# Patient Record
Sex: FEMALE | Race: WHITE
[De-identification: names, ages, dates, MRNs, and addresses within clinical notes are randomized per-mention and may not be internally consistent; named-entity substitution may affect disease eponyms.]

---

## 2019-07-31 ENCOUNTER — HOSPITAL ENCOUNTER (OUTPATIENT)
Dept: HOSPITAL 92 - SCSMAMMO | Age: 54
Discharge: HOME | End: 2019-07-31
Attending: FAMILY MEDICINE
Payer: COMMERCIAL

## 2019-07-31 DIAGNOSIS — Z80.3: ICD-10-CM

## 2019-07-31 DIAGNOSIS — Z12.31: Primary | ICD-10-CM

## 2019-07-31 PROCEDURE — 77067 SCR MAMMO BI INCL CAD: CPT

## 2019-08-01 NOTE — MMO
Bilateral MAMMO Bilat Screen [facility].

 

CLINICAL HISTORY:

Patient is 53 years old and is seen for screening. The patient has the following

family history of breast cancer:  mother, at age 58.  The patient has no

personal history of cancer.

 

VIEWS:

The views performed were:  bilateral craniocaudal and bilateral mediolateral

oblique.

 

FILMS COMPARED:

The present examination has been compared to a prior imaging study performed at

[facility] on 06/02/2018.

 

This study has been interpreted with the assistance of computer-aided detection.

 

MAMMOGRAM FINDINGS:

The breasts are heterogeneously dense, which could obscure a lesion on

mammography.

 

There are no suspicious masses, suspicious calcifications, or new areas of

architectural distortion.

 

IMPRESSION:

THERE IS NO MAMMOGRAPHIC EVIDENCE OF MALIGNANCY.

 

A ROUTINE FOLLOW-UP MAMMOGRAM IN 1 YEAR IS RECOMMENDED.

 

ACR BI-RADS Category 1 - Negative

 

MAMMOGRAPHY NOTE:

 1. A negative mammogram report should not delay a biopsy if a dominant of

 clinically suspicious mass is present.

 2. Approximately 10% to 15% of breast cancers are not detected by

 mammography.

 3. Adenosis and dense breasts may obscure an underlying neoplasm.

 

 

Reported by: KATHI QUEZADA MD    Electonically Signed: [id number]